# Patient Record
Sex: FEMALE | Race: WHITE | Employment: FULL TIME | URBAN - METROPOLITAN AREA
[De-identification: names, ages, dates, MRNs, and addresses within clinical notes are randomized per-mention and may not be internally consistent; named-entity substitution may affect disease eponyms.]

---

## 2019-05-22 ENCOUNTER — OFFICE VISIT (OUTPATIENT)
Dept: FAMILY MEDICINE CLINIC | Age: 24
End: 2019-05-22

## 2019-05-22 ENCOUNTER — HOSPITAL ENCOUNTER (OUTPATIENT)
Dept: LAB | Age: 24
Discharge: HOME | End: 2019-05-22
Payer: COMMERCIAL

## 2019-05-22 VITALS
DIASTOLIC BLOOD PRESSURE: 80 MMHG | BODY MASS INDEX: 31.82 KG/M2 | TEMPERATURE: 98.6 F | HEIGHT: 64 IN | OXYGEN SATURATION: 100 % | SYSTOLIC BLOOD PRESSURE: 121 MMHG | RESPIRATION RATE: 18 BRPM | HEART RATE: 93 BPM | WEIGHT: 186.4 LBS

## 2019-05-22 DIAGNOSIS — R53.83 FATIGUE, UNSPECIFIED TYPE: ICD-10-CM

## 2019-05-22 DIAGNOSIS — Z87.828 HISTORY OF INSECT BITE: ICD-10-CM

## 2019-05-22 DIAGNOSIS — M79.605 PAIN IN BOTH LOWER EXTREMITIES: ICD-10-CM

## 2019-05-22 DIAGNOSIS — M79.604 PAIN IN BOTH LOWER EXTREMITIES: ICD-10-CM

## 2019-05-22 DIAGNOSIS — R22.43 LOCALIZED SWELLING OF BOTH LOWER LEGS: Primary | ICD-10-CM

## 2019-05-22 DIAGNOSIS — R22.43 LOCALIZED SWELLING OF BOTH LOWER LEGS: ICD-10-CM

## 2019-05-22 PROCEDURE — 86617 LYME DISEASE ANTIBODY: CPT

## 2019-05-22 PROCEDURE — 82306 VITAMIN D 25 HYDROXY: CPT

## 2019-05-22 PROCEDURE — 86038 ANTINUCLEAR ANTIBODIES: CPT

## 2019-05-22 RX ORDER — BUPROPION HYDROCHLORIDE 150 MG/1
150 TABLET ORAL
COMMUNITY

## 2019-05-22 RX ORDER — DOXYCYCLINE 100 MG/1
100 CAPSULE ORAL 2 TIMES DAILY
COMMUNITY

## 2019-05-22 RX ORDER — ERGOCALCIFEROL 1.25 MG/1
50000 CAPSULE ORAL
COMMUNITY

## 2019-05-22 RX ORDER — BUSPIRONE HYDROCHLORIDE 15 MG/1
5 TABLET ORAL DAILY
COMMUNITY

## 2019-05-22 NOTE — PROGRESS NOTES
Mira Montoya is a 30 y.o. female here today for a hospital follow up for bilateral leg swelling. It started in August of 2018 after she noticed a tick on her. Her PCP is Dr. Douglas Billy at [address] [place] [address]. She is scheduled to see a vascular doctor this Friday. She also see  at 1 foot 2 foot.

## 2019-05-22 NOTE — PROGRESS NOTES
No chief complaint on file. Megan Nagel is a 30y.o. year old female who presents for the first time to our practice for the follow up from the ED. Was seen at [facility] ED yesterday  medical problems  Bilateral leg swelling since last July. One Foot Two Foot. Left Boot x 3 week. Numbness and tingling, and rash  Fatigued and sleepy. Tick bites June of last year and within tow months started having swelling. Her legs have not been normal since then. Has an appointment with Vascular this Friday. Med review done  Medical Hx updated in the chart      Review of Systems:   Pt denies: Wt loss, Fever/Chills, HA, Visual changes, Fatigue, Chest pain, SOB, MERINO, Abd pain, N/V/D/C, Blood in stool or urine, Edema. Pertinent positive as above in HPI. All others negative. Current Outpatient Medications   Medication Sig Dispense Refill    norethindrone-e.estradiol-iron (LO LOESTRIN FE) 1 mg-10 mcg (24)/10 mcg (2) tab Take  by mouth.  buPROPion XL (WELLBUTRIN XL) 150 mg tablet Take 150 mg by mouth every morning.  busPIRone (BUSPAR) 15 mg tablet Take 5 mg by mouth daily.  ergocalciferol (VITAMIN D2) 50,000 unit capsule Take 50,000 Units by mouth every seven (7) days.  doxycycline (MONODOX) 100 mg capsule Take 100 mg by mouth two (2) times a day. There is no problem list on file for this patient. Past Medical History:   Diagnosis Date    IBS (irritable bowel syndrome)        Past Surgical History:   Procedure Laterality Date    HX COLONOSCOPY  2012       Allergies   Allergen Reactions    Amoxicillin Swelling    Pcn [Penicillins] Swelling       No family history on file. Objective:     Vitals:    05/22/19 1033   BP: 121/80   Pulse: 93   Resp: 18   Temp: 98.6 °F (37 °C)   TempSrc: Oral   SpO2: 100%   Weight: 186 lb 6.4 oz (84.6 kg)   Height: 5' 4\" (1.626 m)     Body mass index is 32 kg/m².     Exam:   Appearance: alert, well appearing, in no respiratory distress and well hydrated. Neck:  No cervical lymphadenopathy, no JVD, no thyromegaly  Heart:  RRR without M/R/G  Lungs:  CTAB, no rhonchi, rales, or wheezes with good air exchange  Abdomen: soft, normoactive BS, non tender, no organomegaly, no palpable mass   Ext:  + non pitting edema on on lower extremities. Skin: no rash   Neuro:AAO x 3, no focal deficiPatient aware of result. ts    Assessment/ Plan:       ICD-10-CM ICD-9-CM    1. Localized swelling of both lower legs R22.43 729.81 ANTINUCLEAR ANTIBODIES, IFA      LYME AB, IGG & IGM BY WB      VITAMIN D, 25 HYDROXY   2. History of insect bite Z87.828 V15.59 LYME AB, IGG & IGM BY WB   3. Pain in both lower extremities M79.604 729.5 VITAMIN D, 25 HYDROXY    M79.605     4. Fatigue, unspecified type R53.83 780.79 ANTINUCLEAR ANTIBODIES, IFA      VITAMIN D, 25 HYDROXY     Lab ordered. I have discussed the diagnosis with the patient and the intended plan as seen in the above orders. The patient has received an after-visit summary and questions were answered concerning future plans. I have discussed medication side effects and warnings with the patient as well. I have reviewed the plan of care with the patient, accepted their input and they are in agreement with the treatment goals. Follow-up and Dispositions    · Return if symptoms worsen or fail to improve.      TOM Campos

## 2019-05-23 LAB — 25(OH)D3 SERPL-MCNC: 23.9 NG/ML (ref 30–100)

## 2019-05-24 LAB — ANA TITR SER IF: NEGATIVE {TITER}

## 2019-05-28 ENCOUNTER — TELEPHONE (OUTPATIENT)
Dept: FAMILY MEDICINE CLINIC | Age: 24
End: 2019-05-28

## 2019-05-28 LAB
B BURGDOR IGG PATRN SER IB-IMP: NEGATIVE
B BURGDOR IGM PATRN SER IB-IMP: NEGATIVE
B BURGDOR18KD IGG SER QL IB: PRESENT
B BURGDOR23KD IGG SER QL IB: ABNORMAL
B BURGDOR23KD IGM SER QL IB: ABNORMAL
B BURGDOR28KD IGG SER QL IB: ABNORMAL
B BURGDOR30KD IGG SER QL IB: ABNORMAL
B BURGDOR39KD IGG SER QL IB: ABNORMAL
B BURGDOR39KD IGM SER QL IB: ABNORMAL
B BURGDOR41KD IGG SER QL IB: ABNORMAL
B BURGDOR41KD IGM SER QL IB: ABNORMAL
B BURGDOR45KD IGG SER QL IB: ABNORMAL
B BURGDOR58KD IGG SER QL IB: ABNORMAL
B BURGDOR66KD IGG SER QL IB: ABNORMAL
B BURGDOR93KD IGG SER QL IB: ABNORMAL

## 2019-05-28 NOTE — TELEPHONE ENCOUNTER
Patient is calling would like to have the results of her labs.    Please call once they have been reviewed

## 2019-05-29 ENCOUNTER — TELEPHONE (OUTPATIENT)
Dept: FAMILY MEDICINE CLINIC | Age: 24
End: 2019-05-29

## 2019-05-31 ENCOUNTER — TELEPHONE (OUTPATIENT)
Dept: FAMILY MEDICINE CLINIC | Age: 24
End: 2019-05-31

## 2019-06-10 ENCOUNTER — TELEPHONE (OUTPATIENT)
Dept: FAMILY MEDICINE CLINIC | Age: 24
End: 2019-06-10

## 2019-06-10 DIAGNOSIS — M79.605 PAIN IN BOTH LOWER EXTREMITIES: ICD-10-CM

## 2019-06-10 DIAGNOSIS — R22.43 LOCALIZED SWELLING OF BOTH LOWER LEGS: Primary | ICD-10-CM

## 2019-06-10 DIAGNOSIS — M79.604 PAIN IN BOTH LOWER EXTREMITIES: ICD-10-CM

## 2019-06-10 NOTE — TELEPHONE ENCOUNTER
Patient was seen in the office on 5/22/19 and called today to speak to Kristan Keating LPN in reference to a referral request to the [facility] in [place]. Patient is requesting a call back.

## 2019-06-11 NOTE — TELEPHONE ENCOUNTER
Patient was made aware that fax was sent to their [place] office because of several failed attempts, the fax would not go through. She is aware to contact their [place] office and discuss the scheduling. She voiced understanding.